# Patient Record
Sex: FEMALE | Race: WHITE | NOT HISPANIC OR LATINO
[De-identification: names, ages, dates, MRNs, and addresses within clinical notes are randomized per-mention and may not be internally consistent; named-entity substitution may affect disease eponyms.]

---

## 2019-01-10 ENCOUNTER — APPOINTMENT (OUTPATIENT)
Dept: OBGYN | Facility: CLINIC | Age: 24
End: 2019-01-10

## 2019-03-07 ENCOUNTER — APPOINTMENT (OUTPATIENT)
Dept: OBGYN | Facility: CLINIC | Age: 24
End: 2019-03-07
Payer: COMMERCIAL

## 2019-03-07 ENCOUNTER — OUTPATIENT (OUTPATIENT)
Dept: OUTPATIENT SERVICES | Facility: HOSPITAL | Age: 24
LOS: 1 days | Discharge: HOME | End: 2019-03-07

## 2019-03-07 VITALS
SYSTOLIC BLOOD PRESSURE: 100 MMHG | DIASTOLIC BLOOD PRESSURE: 60 MMHG | BODY MASS INDEX: 17.94 KG/M2 | HEIGHT: 62.5 IN | WEIGHT: 100 LBS

## 2019-03-07 DIAGNOSIS — Z01.419 ENCOUNTER FOR GYNECOLOGICAL EXAMINATION (GENERAL) (ROUTINE) WITHOUT ABNORMAL FINDINGS: ICD-10-CM

## 2019-03-07 DIAGNOSIS — Z78.9 OTHER SPECIFIED HEALTH STATUS: ICD-10-CM

## 2019-03-07 LAB
BILIRUB UR QL STRIP: NORMAL
GLUCOSE UR-MCNC: NORMAL
HCG UR QL: NORMAL EU/DL
HGB UR QL STRIP.AUTO: 50
KETONES UR-MCNC: NORMAL
LEUKOCYTE ESTERASE UR QL STRIP: NORMAL
NITRITE UR QL STRIP: NORMAL
PH UR STRIP: 6.5
PROT UR STRIP-MCNC: NORMAL
SP GR UR STRIP: 1.01

## 2019-03-07 PROCEDURE — 99395 PREV VISIT EST AGE 18-39: CPT

## 2019-03-07 PROCEDURE — 81003 URINALYSIS AUTO W/O SCOPE: CPT | Mod: NC,QW

## 2019-03-07 NOTE — PHYSICAL EXAM
[Awake] : awake [Alert] : alert [Soft] : soft [Oriented x3] : oriented to person, place, and time [Normal] : uterus [Uterine Adnexae] : were not tender and not enlarged [RRR, No Murmurs] : RRR, no murmurs [CTAB] : CTAB [Acute Distress] : no acute distress [LAD] : no lymphadenopathy [Thyroid Nodule] : no thyroid nodule [Goiter] : no goiter [Mass] : no breast mass [Nipple Discharge] : no nipple discharge [Axillary LAD] : no axillary lymphadenopathy [Tender] : non tender [Distended] : not distended [Depressed Mood] : not depressed

## 2019-03-07 NOTE — COUNSELING
[Breast Self Exam] : breast self exam [Exercise] : exercise [Vitamins/Supplements] : vitamins/supplements [STD (testing, results, tx)] : STD (testing, results, tx) [Contraception] : contraception [Vaccines] : vaccines [Safe Sexual Practices] : safe sexual practices

## 2019-03-14 LAB
C TRACH RRNA SPEC QL NAA+PROBE: NOT DETECTED
N GONORRHOEA RRNA SPEC QL NAA+PROBE: NOT DETECTED
SOURCE AMPLIFICATION: NORMAL

## 2020-03-12 ENCOUNTER — APPOINTMENT (OUTPATIENT)
Dept: OBGYN | Facility: CLINIC | Age: 25
End: 2020-03-12

## 2020-05-19 ENCOUNTER — APPOINTMENT (OUTPATIENT)
Dept: DISASTER EMERGENCY | Facility: CLINIC | Age: 25
End: 2020-05-19

## 2020-05-19 ENCOUNTER — MESSAGE (OUTPATIENT)
Age: 25
End: 2020-05-19

## 2020-07-09 ENCOUNTER — OUTPATIENT (OUTPATIENT)
Dept: OUTPATIENT SERVICES | Facility: HOSPITAL | Age: 25
LOS: 1 days | Discharge: HOME | End: 2020-07-09

## 2020-07-09 DIAGNOSIS — Z00.8 ENCOUNTER FOR OTHER GENERAL EXAMINATION: ICD-10-CM

## 2020-07-13 ENCOUNTER — OUTPATIENT (OUTPATIENT)
Dept: OUTPATIENT SERVICES | Facility: HOSPITAL | Age: 25
LOS: 1 days | Discharge: HOME | End: 2020-07-13

## 2020-07-13 DIAGNOSIS — Z00.8 ENCOUNTER FOR OTHER GENERAL EXAMINATION: ICD-10-CM

## 2020-07-13 LAB — HCG UR QL: NEGATIVE — SIGNIFICANT CHANGE UP

## 2020-08-07 ENCOUNTER — TRANSCRIPTION ENCOUNTER (OUTPATIENT)
Age: 25
End: 2020-08-07

## 2020-09-24 ENCOUNTER — APPOINTMENT (OUTPATIENT)
Dept: OBGYN | Facility: CLINIC | Age: 25
End: 2020-09-24
Payer: COMMERCIAL

## 2020-09-24 VITALS — DIASTOLIC BLOOD PRESSURE: 60 MMHG | WEIGHT: 109 LBS | TEMPERATURE: 98 F | SYSTOLIC BLOOD PRESSURE: 100 MMHG

## 2020-09-24 PROCEDURE — 99212 OFFICE O/P EST SF 10 MIN: CPT | Mod: 25

## 2020-09-24 PROCEDURE — 99395 PREV VISIT EST AGE 18-39: CPT

## 2020-09-24 NOTE — HISTORY OF PRESENT ILLNESS
[FreeTextEntry1] : \par  [N] : Patient denies prior pregnancies [TextBox_4] : 25yo who presents for annual GYN exam. Doing well.\par \par Menarche at age 16, regular, lasting 4-5 days. Heavy bleeding x2 days, severe cramping. Motrin PRN.\par \par Initiated OCPs about 1 year ago for treatment of dysmenorrhea. Discontinued after a couple months due to moodiness. Would like to try another pill. Has taken [place] in the past with good tolerance.\par \par Sexually active with partner of 1.5 years\par Denies dyspareunia\par Contraception: inconsistent condoms\par Denies history of STIs\par Denies vaginal discharge, irritation or odor [PapSmeardate] : 7/2019 [TextBox_31] : CHRISTIANO

## 2020-09-24 NOTE — PHYSICAL EXAM
[Appropriately responsive] : appropriately responsive [Alert] : alert [No Acute Distress] : no acute distress [No Lymphadenopathy] : no lymphadenopathy [Regular Rate Rhythm] : regular rate rhythm [No Murmurs] : no murmurs [Clear to Auscultation B/L] : clear to auscultation bilaterally [Soft] : soft [Non-tender] : non-tender [Non-distended] : non-distended [No HSM] : No HSM [Oriented x3] : oriented x3 [Examination Of The Breasts] : a normal appearance [No Masses] : no breast masses were palpable [Normal] : normal [Uterine Adnexae] : normal [FreeTextEntry8] : Uterus small, anteverted, mobile and nontender. No CMT

## 2020-09-24 NOTE — COUNSELING
[Nutrition/ Exercise/ Weight Management] : nutrition, exercise, weight management [Breast Self Exam] : breast self exam [Contraception/ Emergency Contraception/ Safe Sexual Practices] : contraception, emergency contraception, safe sexual practices

## 2021-02-04 ENCOUNTER — OUTPATIENT (OUTPATIENT)
Dept: OUTPATIENT SERVICES | Facility: HOSPITAL | Age: 26
LOS: 1 days | Discharge: HOME | End: 2021-02-04
Payer: COMMERCIAL

## 2021-02-04 DIAGNOSIS — R05 COUGH: ICD-10-CM

## 2021-02-04 PROCEDURE — 71046 X-RAY EXAM CHEST 2 VIEWS: CPT | Mod: 26

## 2021-05-12 ENCOUNTER — OUTPATIENT (OUTPATIENT)
Dept: OUTPATIENT SERVICES | Facility: HOSPITAL | Age: 26
LOS: 1 days | Discharge: HOME | End: 2021-05-12
Payer: COMMERCIAL

## 2021-05-12 DIAGNOSIS — M79.609 PAIN IN UNSPECIFIED LIMB: ICD-10-CM

## 2021-05-12 PROCEDURE — 93970 EXTREMITY STUDY: CPT | Mod: 26

## 2021-07-29 ENCOUNTER — APPOINTMENT (OUTPATIENT)
Dept: OBGYN | Facility: CLINIC | Age: 26
End: 2021-07-29
Payer: COMMERCIAL

## 2021-07-29 VITALS — DIASTOLIC BLOOD PRESSURE: 60 MMHG | WEIGHT: 118 LBS | SYSTOLIC BLOOD PRESSURE: 100 MMHG

## 2021-07-29 PROCEDURE — 99213 OFFICE O/P EST LOW 20 MIN: CPT

## 2021-07-29 RX ORDER — NORGESTIMATE AND ETHINYL ESTRADIOL 7DAYSX3 LO
0.18/0.215/0.25 KIT ORAL
Qty: 84 | Refills: 1 | Status: DISCONTINUED | COMMUNITY
Start: 2020-09-24 | End: 2021-07-29

## 2021-07-29 NOTE — PLAN
[FreeTextEntry1] : \par Discussed OCP initiation for 20 minutes. Patient is aware that she will be given oral contraceptive pills and if taken correctly, meaning same time every day, the pill will prevent pregnancy. Also counseled what to do in case of a missed pill.\par Explained the following benefits aside from pregnancy prevention:\par -decreased menstrual cramps\par -more regular menstrual bleeding \par -decreased menstrual bleeding \par -helps improve acne\par -protects against ovarian and uterine cancer. Also, protects fertility - no ovulation on ocps.\par Also discussed possible side effects including HA, irregular bleeding, amenorrhea, breast tenderness and mood changes. Weight gain is considered a myth however birth control can increase or decrease appetite within first 3 months of starting pill. Patient is reminded that condoms should be used to prevent sexually transmitted diseases.\par Potential complications of birth control pills including thromboembolic events (such as blood clots of legs/lungs, stroke) HTN, hyperlipidemia, gallbladder disease and benign liver cysts were discussed. Patient is aware of the need to refrain from smoking while taking OCPs, as this would further increase her risk of thromboembolic events.\par Advised patient to contact office immediately if any of the following occur:\par -unusual headache\par -change in vision or blurry\par -leg pain\par -leg swelling\par -rash\par -severe abdominal pain\par -chest pain\par -shortness of breath\par Pt to F/U in Sept for annual exam,. \par

## 2021-07-29 NOTE — HISTORY OF PRESENT ILLNESS
[Regular Cycle Intervals] : periods have been regular [Menarche Age: ____] : age at menarche was [unfilled] [No] : Patient does not have concerns regarding sex [Currently Active] : currently active [Men] : men [Yes] : Yes [FreeTextEntry1] : 7/18/21

## 2021-08-08 ENCOUNTER — TRANSCRIPTION ENCOUNTER (OUTPATIENT)
Age: 26
End: 2021-08-08

## 2021-10-14 ENCOUNTER — EMERGENCY (EMERGENCY)
Facility: HOSPITAL | Age: 26
LOS: 0 days | Discharge: HOME | End: 2021-10-14
Attending: EMERGENCY MEDICINE | Admitting: EMERGENCY MEDICINE
Payer: COMMERCIAL

## 2021-10-14 VITALS
RESPIRATION RATE: 18 BRPM | DIASTOLIC BLOOD PRESSURE: 76 MMHG | WEIGHT: 117.95 LBS | OXYGEN SATURATION: 99 % | TEMPERATURE: 97 F | HEIGHT: 63 IN | SYSTOLIC BLOOD PRESSURE: 137 MMHG | HEART RATE: 86 BPM

## 2021-10-14 DIAGNOSIS — R09.89 OTHER SPECIFIED SYMPTOMS AND SIGNS INVOLVING THE CIRCULATORY AND RESPIRATORY SYSTEMS: ICD-10-CM

## 2021-10-14 DIAGNOSIS — T17.298A OTHER FOREIGN OBJECT IN PHARYNX CAUSING OTHER INJURY, INITIAL ENCOUNTER: ICD-10-CM

## 2021-10-14 DIAGNOSIS — Z71.1 PERSON WITH FEARED HEALTH COMPLAINT IN WHOM NO DIAGNOSIS IS MADE: ICD-10-CM

## 2021-10-14 PROCEDURE — 99284 EMERGENCY DEPT VISIT MOD MDM: CPT

## 2021-10-14 PROCEDURE — 70360 X-RAY EXAM OF NECK: CPT | Mod: 26

## 2021-10-14 NOTE — ED PROVIDER NOTE - NSFOLLOWUPINSTRUCTIONS_ED_ALL_ED_FT
Swallowed Foreign Body, Adult    A swallowed foreign body means that you swallow something and it gets stuck. It might be food or something else. The object may get stuck in the tube that connects your throat to your stomach (esophagus), or it may get stuck in another part of your belly (digestive tract). It is very important to tell your doctor what you have swallowed.    Sometimes, the object will pass through your body on its own. Sometimes, the object will pass after you are given a medicine to relax your throat. The object may need to be taken out by a doctor if it is dangerous or if it will not pass through your body on its own. An object may need to be removed with surgery if:  It gets stuck in your throat.  You cannot swallow.  You cannot breathe well.  It is sharp.  It is harmful or poisonous (toxic), like batteries or drugs.  Follow these instructions at home:  Eat what you normally eat if your doctor says that you can.  Keep checking your poop (stool) to see if the object has come out of your body.  Call your doctor if the object has not come out of your body after 3 days.  If you had surgery (endoscopy) to remove the object:  Care for yourself after surgery as told by your doctor.  Keep all follow-up visits as told by your doctor. This is important.    Contact a doctor if:  You still have problems after you have been treated.  The object has not come out of your body after 3 days.  Get help right away if:  You have a fever.  You have pain in your chest or your belly.  You cough up blood.  You have blood in your poop or your throw up (vomit).  This information is not intended to replace advice given to you by your health care provider. Make sure you discuss any questions you have with your health care provider.    Document Released: 04/03/2012 Document Revised: 05/25/2017 Document Reviewed: 03/16/2016  Elsevier Interactive Patient Education © 2019 Elsevier Inc.

## 2021-10-14 NOTE — ED PROVIDER NOTE - PATIENT PORTAL LINK FT
You can access the FollowMyHealth Patient Portal offered by [facility] by registering at the following website: [email or web address]. By joining [employer]’s FollowMyHealth portal, you will also be able to view your health information using other applications (apps) compatible with our system.

## 2021-10-14 NOTE — ED PROVIDER NOTE - ATTENDING CONTRIBUTION TO CARE
I personally evaluated the patient. I reviewed the Resident’s or Physician Assistant’s note (as assigned above), and agree with the findings and plan except as documented in my note.     25 female here for foreign body sensation from a recently administered Rx, macrobid for UTI. No drooling, change in voice, difficulty swallowing.  Has sensation of foreign body in her throat that is persistent. No prior history of similar.     ROS otherwise unremarkable    PE: female in no distress. CV: pulses intact. CHEST: normal work of breathing. NECK: normal ROM. no masses. ABD: nondistended. SKIN: normal. EXT: FROM. NEURO: AAO 3 no focal deficits. HEENT: mucosa normal no obvious foreign body noted in oropharynx.     Impression: foreign body    Plan: imaging, reevaluation

## 2021-10-14 NOTE — ED PROVIDER NOTE - PHYSICAL EXAMINATION
CONSTITUTIONAL: Well-developed; well-nourished; in no acute distress  SKIN: warm, dry  HEAD: Normocephalic  EYES: no conjunctival erythema  ENT: No nasal discharge; airway clear, normal oropharynx no foreign body visualized  NECK: non tender full rom  CARD: Regular rate and rhythm  RESP: No wheezes, rales or rhonchi, normal respiratory effort  EXT: Normal ROM  NEURO: Alert, oriented, grossly unremarkable  PSYCH: Cooperative, appropriate

## 2021-10-14 NOTE — ED PROVIDER NOTE - CLINICAL SUMMARY MEDICAL DECISION MAKING FREE TEXT BOX
25 female here for foreign body sensation after a self administered pill. Had screening imaging supportive care medications and reevaluation, no acute emergent findings, symptoms unchanged, plan discussed with patient, advised to drink carbonated beverages at home, will discharge with outpatient management and return and follow up instructions.

## 2021-10-14 NOTE — ED PROVIDER NOTE - CARE PROVIDER_API CALL
Pillo Cruz)  Otolaryngology  [address] [place] [address], [address] [place] [address]  Phone: [phone]  Fax: [phone]  Follow Up Time: 1-3 Days

## 2021-10-14 NOTE — ED PROVIDER NOTE - NSFOLLOWUPCLINICS_GEN_ALL_ED_FT
[facility]  ENT  [address], 2nd [place] [address]  Phone: [phone]  Fax:   Follow Up Time: 4-6 Days

## 2021-10-14 NOTE — ED PROVIDER NOTE - OBJECTIVE STATEMENT
25yof w/o pmhx of uti who present with throat irritation. she took macrobid pill 330pm for her uti and felt like the pill was stuck in her throat. has been able to swallow fluids and secretion, and has been breathing without issues. no f cp sob abd pain n v. she tried drinking a lot of water rapidly but the irritation sensation is still there.

## 2021-10-14 NOTE — ED PROVIDER NOTE - NS ED ROS FT
Constitutional: No fever   Eyes:  No visual changes  Ears:  No hearing changes  Neck: No neck pain +throat irritation  Cardiac:  No chest pain  Respiratory:  No SOB   GI:  No abdominal pain, nausea, or vomiting  :  No dysuria  MS:  No back pain  Neuro:  No headache or weakness.  No LOC  Skin:  No skin rash

## 2021-10-15 ENCOUNTER — INPATIENT (INPATIENT)
Facility: HOSPITAL | Age: 26
LOS: 0 days | Discharge: HOME | End: 2021-10-16
Attending: INTERNAL MEDICINE | Admitting: INTERNAL MEDICINE
Payer: COMMERCIAL

## 2021-10-15 VITALS
HEIGHT: 63 IN | TEMPERATURE: 99 F | OXYGEN SATURATION: 99 % | HEART RATE: 84 BPM | SYSTOLIC BLOOD PRESSURE: 115 MMHG | RESPIRATION RATE: 20 BRPM | DIASTOLIC BLOOD PRESSURE: 73 MMHG | WEIGHT: 117.95 LBS

## 2021-10-15 LAB
ALBUMIN SERPL ELPH-MCNC: 5 G/DL — SIGNIFICANT CHANGE UP (ref 3.5–5.2)
ALP SERPL-CCNC: 65 U/L — SIGNIFICANT CHANGE UP (ref 30–115)
ALT FLD-CCNC: 11 U/L — SIGNIFICANT CHANGE UP (ref 0–41)
ANION GAP SERPL CALC-SCNC: 19 MMOL/L — HIGH (ref 7–14)
APTT BLD: 29.1 SEC — SIGNIFICANT CHANGE UP (ref 27–39.2)
AST SERPL-CCNC: 20 U/L — SIGNIFICANT CHANGE UP (ref 0–41)
BASOPHILS # BLD AUTO: 0.03 K/UL — SIGNIFICANT CHANGE UP (ref 0–0.2)
BASOPHILS NFR BLD AUTO: 0.3 % — SIGNIFICANT CHANGE UP (ref 0–1)
BILIRUB SERPL-MCNC: 0.5 MG/DL — SIGNIFICANT CHANGE UP (ref 0.2–1.2)
BLD GP AB SCN SERPL QL: SIGNIFICANT CHANGE UP
BUN SERPL-MCNC: 11 MG/DL — SIGNIFICANT CHANGE UP (ref 10–20)
CALCIUM SERPL-MCNC: 9.6 MG/DL — SIGNIFICANT CHANGE UP (ref 8.5–10.1)
CHLORIDE SERPL-SCNC: 100 MMOL/L — SIGNIFICANT CHANGE UP (ref 98–110)
CO2 SERPL-SCNC: 19 MMOL/L — SIGNIFICANT CHANGE UP (ref 17–32)
CREAT SERPL-MCNC: 0.6 MG/DL — LOW (ref 0.7–1.5)
EOSINOPHIL # BLD AUTO: 0.03 K/UL — SIGNIFICANT CHANGE UP (ref 0–0.7)
EOSINOPHIL NFR BLD AUTO: 0.3 % — SIGNIFICANT CHANGE UP (ref 0–8)
GLUCOSE SERPL-MCNC: 84 MG/DL — SIGNIFICANT CHANGE UP (ref 70–99)
HCT VFR BLD CALC: 40.5 % — SIGNIFICANT CHANGE UP (ref 37–47)
HGB BLD-MCNC: 13.2 G/DL — SIGNIFICANT CHANGE UP (ref 12–16)
IMM GRANULOCYTES NFR BLD AUTO: 0.4 % — HIGH (ref 0.1–0.3)
INR BLD: 1.17 RATIO — SIGNIFICANT CHANGE UP (ref 0.65–1.3)
LYMPHOCYTES # BLD AUTO: 1.13 K/UL — LOW (ref 1.2–3.4)
LYMPHOCYTES # BLD AUTO: 9.4 % — LOW (ref 20.5–51.1)
MCHC RBC-ENTMCNC: 27.7 PG — SIGNIFICANT CHANGE UP (ref 27–31)
MCHC RBC-ENTMCNC: 32.6 G/DL — SIGNIFICANT CHANGE UP (ref 32–37)
MCV RBC AUTO: 84.9 FL — SIGNIFICANT CHANGE UP (ref 81–99)
MONOCYTES # BLD AUTO: 0.5 K/UL — SIGNIFICANT CHANGE UP (ref 0.1–0.6)
MONOCYTES NFR BLD AUTO: 4.2 % — SIGNIFICANT CHANGE UP (ref 1.7–9.3)
NEUTROPHILS # BLD AUTO: 10.22 K/UL — HIGH (ref 1.4–6.5)
NEUTROPHILS NFR BLD AUTO: 85.4 % — HIGH (ref 42.2–75.2)
NRBC # BLD: 0 /100 WBCS — SIGNIFICANT CHANGE UP (ref 0–0)
PLATELET # BLD AUTO: 317 K/UL — SIGNIFICANT CHANGE UP (ref 130–400)
POTASSIUM SERPL-MCNC: 3.9 MMOL/L — SIGNIFICANT CHANGE UP (ref 3.5–5)
POTASSIUM SERPL-SCNC: 3.9 MMOL/L — SIGNIFICANT CHANGE UP (ref 3.5–5)
PROT SERPL-MCNC: 8 G/DL — SIGNIFICANT CHANGE UP (ref 6–8)
PROTHROM AB SERPL-ACNC: 13.4 SEC — HIGH (ref 9.95–12.87)
RBC # BLD: 4.77 M/UL — SIGNIFICANT CHANGE UP (ref 4.2–5.4)
RBC # FLD: 12.9 % — SIGNIFICANT CHANGE UP (ref 11.5–14.5)
SODIUM SERPL-SCNC: 138 MMOL/L — SIGNIFICANT CHANGE UP (ref 135–146)
WBC # BLD: 11.96 K/UL — HIGH (ref 4.8–10.8)
WBC # FLD AUTO: 11.96 K/UL — HIGH (ref 4.8–10.8)

## 2021-10-15 PROCEDURE — 70360 X-RAY EXAM OF NECK: CPT | Mod: 26

## 2021-10-15 PROCEDURE — 99285 EMERGENCY DEPT VISIT HI MDM: CPT

## 2021-10-15 PROCEDURE — 70490 CT SOFT TISSUE NECK W/O DYE: CPT | Mod: 26,MA

## 2021-10-15 RX ORDER — IOHEXOL 300 MG/ML
30 INJECTION, SOLUTION INTRAVENOUS ONCE
Refills: 0 | Status: COMPLETED | OUTPATIENT
Start: 2021-10-15 | End: 2021-10-15

## 2021-10-15 RX ORDER — GLUCAGON INJECTION, SOLUTION 0.5 MG/.1ML
1 INJECTION, SOLUTION SUBCUTANEOUS ONCE
Refills: 0 | Status: COMPLETED | OUTPATIENT
Start: 2021-10-15 | End: 2021-10-15

## 2021-10-15 RX ADMIN — GLUCAGON INJECTION, SOLUTION 1 MILLIGRAM(S): 0.5 INJECTION, SOLUTION SUBCUTANEOUS at 15:59

## 2021-10-15 RX ADMIN — IOHEXOL 30 MILLILITER(S): 300 INJECTION, SOLUTION INTRAVENOUS at 19:17

## 2021-10-15 NOTE — CONSULT NOTE ADULT - SUBJECTIVE AND OBJECTIVE BOX
Gastroenterology Consultation:    Patient is a 25y old  Female who presents with a chief complaint of       Admitted on: 10-15-21      HPI:    24yo Female who presents with FB sensation - pt brought back to ED for FB noted on xray. PT seen and examined at bedside - states she took a Macrobid pill, felt it get stuck in her throat/chest and came to the ED. [facility] and sent patient [facility], has been tolerating PO since (solid and liquid), but still feels sensation of something stuck. Pt able to speak clearly and without any SOB/diff breathing.      Prior EGD: none    Prior Colonoscopy: none      PAST MEDICAL & SURGICAL HISTORY:        FAMILY HISTORY:      Social History:  Tobacco: deneis  Alcohol: denies  Drugs: denies    Home Medications:        MEDICATIONS  (STANDING):    MEDICATIONS  (PRN):      Allergies  No Known Allergies      Review of Systems:   Constitutional:  No Fever, No Chills  ENT/Mouth:  No Hearing Changes,  No Difficulty Swallowing  Eyes:  No Eye Pain, No Vision Changes  Cardiovascular:  No Chest Pain, No Palpitations  Respiratory:  No Cough, No Dyspnea  Gastrointestinal:  As described in HPI  Musculoskeletal:  No Joint Swelling, No Back Pain  Skin:  No Skin Lesions, No Jaundice  Neuro:  No Syncope, No Dizziness  Heme/Lymph:  No Bruising, No Bleeding.          Physical Examination:  T(C): 37 (10-15-21 @ 14:28), Max: 37 (10-15-21 @ 14:28)  HR: 84 (10-15-21 @ 14:28) (84 - 84)  BP: 115/73 (10-15-21 @ 14:28) (115/73 - 115/73)  RR: 20 (10-15-21 @ 14:28) (20 - 20)  SpO2: 99% (10-15-21 @ 14:28) (99% - 99%)  Height (cm): 160 (10-15-21 @ 14:28), 160 (10-14-21 @ 17:35)  Weight (kg): 53.5 (10-15-21 @ 14:28), 53.5 (10-14-21 @ 17:35)        GENERAL: AAOx3, no acute distress.  HEAD:  Atraumatic, Normocephalic  EYES: conjunctiva and sclera clear  NECK: Supple, no JVD or thyromegaly  CHEST/LUNG: Clear to auscultation bilaterally; No wheeze, rhonchi, or rales  HEART: Regular rate and rhythm; normal S1, S2, No murmurs.  ABDOMEN: Soft, nontender, nondistended; Bowel sounds present  NEUROLOGY: No asterixis or tremor.   SKIN: Intact, no jaundice        Data:    Hgb Trend:            Liver panel trend:              Radiology:      
Pt is a 26yo Female who presents with FB sensation - pt brought back to ED for FB noted on xray. PT seen and examined at bedside - states she took a Macrobid pill, felt it get stuck in her throat/chest and came to the ED. [facility] and sent patient home, has been tolerating PO since (solid and liquid), but still feels sensation of something stuck. Pt able to speak clearly and without any SOB/diff breathing.     PAST MEDICAL & SURGICAL HISTORY:  UTI on Macrobid      Allergies    No Known Allergies    Intolerances    REVIEW OF SYSTEMS   [x] A ten-point review of systems was otherwise negative except as noted.      Vital Signs Last 24 Hrs  T(C): 37 (15 Oct 2021 14:28), Max: 37 (15 Oct 2021 14:28)  T(F): 98.6 (15 Oct 2021 14:28), Max: 98.6 (15 Oct 2021 14:28)  HR: 84 (15 Oct 2021 14:28) (84 - 86)  BP: 115/73 (15 Oct 2021 14:28) (115/73 - 137/76)  RR: 20 (15 Oct 2021 14:28) (18 - 20)  SpO2: 99% (15 Oct 2021 14:28) (99% - 99%)    GEN: NAD, awake and alert. No drooling or pooling of secretions. No stridor or stertor. Good vocal quality, no hoarseness.   SKIN: Good color, non diaphoretic.  HEENT: NC/AT; Oral mucosa pink and moist. No erythema or edema noted to buccal mucosa, tongue, FOM, uvula or posterior oropharynx. Uvula midline.   NECK: Trachea midline, Neck supple, no TTP to B/L lateral neck, no cervical LAD.  RESP: No dyspnea, non-labored breathing. No use of accessory muscles.   CARDIO: +S1/S2  ABDO: Soft, NT.  EXT: GUZMAN x 4    Fiberoptic Laryngoscopy: No masses or lesions noted to NP/OP/HP. Laryngeal structures intact, no edema or erythema noted. Epiglottis crisp, no edema. TVC/FVC mobile and intact, no glottic gap noted. No FB noted in larynx.    RADIOLOGY & ADDITIONAL STUDIES:    < from: Xray Neck Soft Tissue (10.15.21 @ 15:46) >    EXAM:  XR NECK SOFT TISSUE            PROCEDURE DATE:  10/15/2021        INTERPRETATION:  Reason for examination: Pill stuck in throat. Rule out foreign body    Examination: Soft tissue neck (AP lateral views).  Comparison: Soft tissue neck-10/14/2021      Findings/  impression:    Lines/ Support devices: none  Previously reported cervical esophageal foreign body measuring 9 x 4 mm is noted (C4-5 level), unchanged in position. See lateral view.      Midline normal-sized trachea.    Unremarkable osseous structures.      --- End of Report ---        FELIPE NEWTON MD; Attending Radiologist  This document has been electronically signed. Oct 15 2021  4:19PM

## 2021-10-15 NOTE — ED ADULT TRIAGE NOTE - CHIEF COMPLAINT QUOTE
Patient states she feels like she has a pill stuck in her throat, patient is a call back for results in xray

## 2021-10-15 NOTE — ED PROVIDER NOTE - PHYSICAL EXAMINATION
GENERAL: Well-nourished, Well-developed. NAD.  HEAD: No visible or palpable bumps or hematomas. No ecchymosis behind ears B/L.  Eyes: PERRLA, EOMI. No asymmetry. No nystagmus. No conjunctival injection. Non-icteric sclera.  ENMT: MMM. No pharyngeal erythema or exudates. Uvula midline. handling secretions.   Neck: Supple. FROM  CVS: Normal S1,S2. No murmurs appreciated on auscultation   RESP: No use of accessory muscles. Chest rise symmetrical with good expansion. Lungs clear to auscultation B/L. No wheezing, rales, or rhonchi auscultated.  GI: Normal auscultation of bowel sounds in all 4 quadrants. Soft, Nontender, Nondistended. No guarding or rebound tenderness. No CVAT B/L.  Skin: Warm, Dry. No rashes or lesions. Good cap refill < 2 sec B/L.  EXT: Radial and pedal pulses present B/L. No calf tenderness or swelling B/L. No palpable cords. No pedal edema B/L.  Neuro: AA&O x 3. Sensation grossly intact. Strength 5/5 B/L. Gait within normal limits.

## 2021-10-15 NOTE — ED POST DISCHARGE NOTE - DETAILS
IS RETURNING TO ED Merged with [facility], IN CASE IN ENT CONSULT IS NEEDED. OBS ED RESIDENT TACO WAS NOTIFIED.

## 2021-10-15 NOTE — ED PROVIDER NOTE - NS_ ATTENDINGSCRIBEDETAILS _ED_A_ED_FT
I have personally evaluated this patient. I have seen this patient with a medical scribe, please see progress for my contribution to care. I have reviewed scribe notes which are accurate.

## 2021-10-15 NOTE — ED PROVIDER NOTE - CLINICAL SUMMARY MEDICAL DECISION MAKING FREE TEXT BOX
ATTENDING NOTE: I personally evaluated the patient. I reviewed the Resident’s note (as assigned above), and agree with the findings and plan except as documented in my note.   26 y/o F with PMH of UTI on Macrobid 7 days ago, p/w lump in throat sensation x 2 dads. Pt was seen at [facility], received XRs and was told to come back for (+) foreign body. Denies f/c, CP, n/v/d. Pt is tolerating PO.   Well appearing, NAD, non toxic. NCAT PERRLA EOMI, no drooling or tenderness in throat neck supple non tender normal wob cta bl rrr abdomen s nt nd no rebound no guarding WWPx4 neuro non focal  XR showed foreign body under the vocal chords, pending ENT I personally evaluated the patient. I reviewed the Resident’s note (as assigned above), and agree with the findings and plan except as documented in my note.   24 y/o F with PMH of UTI on Macrobid 7 days ago, p/w lump in throat sensation x 2 dads. Pt was seen at [facility], received XRs and was told to come back for (+) foreign body. Denies f/c, CP, n/v/d. Pt is tolerating PO.   Well appearing, NAD, non toxic. NCAT PERRLA EOMI, no drooling or tenderness in throat neck supple non tender normal wob cta bl rrr abdomen s nt nd no rebound no guarding WWPx4 neuro non focal  XR showed foreign body under the vocal chords, will admit for endoscopy, ent and GI spoken to.

## 2021-10-15 NOTE — ED PROVIDER NOTE - NS ED ROS FT
Constitutional: (-) fever (-) malaise (-) diaphoresis (-) chills (-) wt. loss (-) bodyaches (-) night sweats  Eyes: (-) visual changes (-) eye pain (-) eye discharge (-) photophobia (-) FB sensation  ENMT: (-) nasal or chest congestion (-) runny nose (-) sore throat (-) hoarseness  (-) hearing changes (-) ear pain (-) ear discharge or infections (-) neck pain (-) neck stiffness (+)foreign body sensation to throat  Cardiac: (-) chest pain  (-) palpitations (-) syncope (-) edema  Respiratory: (-) cough (-) SOB (-) BARRIENTOS  GI: (-) nausea (-) vomiting (-) diarrhea (-) abdominal pain   Neuro: (-) headache (-) dizziness (-) numbness/tingling to extremities B/L (-) weakness  Skin: (-) rash (-) laceration    Except as documented in the HPI, all other systems are negative.

## 2021-10-15 NOTE — ED PROVIDER NOTE - OBJECTIVE STATEMENT
24 yo F pmhx UTI currently taking PO macrobid, pt reports [facility] sensation in throat, feels like pill is stuck in throat, pt states she was discharged and called back today, xray soft tissue neck reveals foreign body. Pt states sensation is still present, is able to eat solids and liquids without difficulty, no drooling, fever, chills, nausea, vomiting, sob.

## 2021-10-15 NOTE — ED PROVIDER NOTE - PROGRESS NOTE DETAILS
NC: ENT consulted ATTENDING NOTE: I personally evaluated the patient. I reviewed the Resident’s note (as assigned above), and agree with the findings and plan except as documented in my note.   24 y/o F with PMH of UTI on Macrobid 7 days ago, p/w lump in throat sensation x 2 dads. Pt was seen at [facility], received XRs and was told to come back for (+) foreign body. Denies f/c, CP, n/v/d. Pt is tolerating PO.   Well appearing, NAD, non toxic. NCAT PERRLA EOMI, no drooling or tenderness in throat neck supple non tender normal wob cta bl rrr abdomen s nt nd no rebound no guarding WWPx4 neuro non focal  XR showed foreign body under the vocal chords, pending ENT. NC: ENT scoped pt at bedside, unable to see foreign body, GI consulted at this time. NC: Spoke to Dr Johnson GI, recommend CT chest and soft tissue neck with oral contrast, no need for endoscopy at this time.

## 2021-10-15 NOTE — ED POST DISCHARGE NOTE - RESULT SUMMARY
NECK XR- CERVICAL ESOPHAGEAL FB NOTED, PATIENT SWALLOWED A PILL. CASE DISCUSSED WITH DR. GIBBONS- SERA PATIENT BACK TO ED FOR REPEAT NECK XR TO ENSURE PASSAGE OF PILL.

## 2021-10-15 NOTE — CONSULT NOTE ADULT - ASSESSMENT
Pt is a 26yo Female who presents with FB sensation - pt brought back to ED for FB noted on xray, negative FFL for FB - esophageal in nature.     ·	GI consult  ·	no ENT intervention  ·	d/w ED staff
26yo Female who presents with FB sensation - pt brought back to ED for FB noted on xray. PT seen and examined at bedside - states she took a Macrobid pill, felt it get stuck in her throat/chest and came to the ED. [facility] and sent patient [facility], has been tolerating PO since (solid and liquid), but still feels sensation of something stuck. Pt able to speak clearly and without any SOB/diff breathing.    #Foreign body sensation (macrobid) in cervical esophagus - no impaction  able to speak full sentences adn tolerate food    Rec  - please get stat CT neck/chest with PO contrast. if unremarkable without evidence of esophageal impaction can be discharged to follow up with GI as outpatient  - Follow up with our [facility] located at [address] [place] [address]. Phone Number: [phone]

## 2021-10-15 NOTE — ED PROVIDER NOTE - ATTENDING CONTRIBUTION TO CARE
Results reviewed and discussed with pt and printed for patient. Anticipatory guidance given including close outpatient followup. Strict return precautions given. Pt verbalizes understanding of and agrees with plan. see mdm for attending note

## 2021-10-16 ENCOUNTER — TRANSCRIPTION ENCOUNTER (OUTPATIENT)
Age: 26
End: 2021-10-16

## 2021-10-16 VITALS
RESPIRATION RATE: 16 BRPM | HEART RATE: 71 BPM | OXYGEN SATURATION: 100 % | TEMPERATURE: 97 F | SYSTOLIC BLOOD PRESSURE: 100 MMHG | DIASTOLIC BLOOD PRESSURE: 61 MMHG

## 2021-10-16 LAB
COVID-19 SPIKE DOMAIN AB INTERP: POSITIVE
COVID-19 SPIKE DOMAIN ANTIBODY RESULT: >250 U/ML — HIGH
SARS-COV-2 IGG+IGM SERPL QL IA: >250 U/ML — HIGH
SARS-COV-2 IGG+IGM SERPL QL IA: POSITIVE
SARS-COV-2 RNA SPEC QL NAA+PROBE: SIGNIFICANT CHANGE UP

## 2021-10-16 PROCEDURE — 99233 SBSQ HOSP IP/OBS HIGH 50: CPT

## 2021-10-16 PROCEDURE — 70360 X-RAY EXAM OF NECK: CPT | Mod: 26

## 2021-10-16 PROCEDURE — 99236 HOSP IP/OBS SAME DATE HI 85: CPT

## 2021-10-16 RX ORDER — PANTOPRAZOLE SODIUM 20 MG/1
1 TABLET, DELAYED RELEASE ORAL
Qty: 15 | Refills: 0
Start: 2021-10-16 | End: 2021-10-30

## 2021-10-16 NOTE — H&P ADULT - HISTORY OF PRESENT ILLNESS
24 y/o Female w/ No PMHx presents to ED for FB sensation. Patient states she recently experiencing Sx of UTI, so had UA which was Pos for LE and Px a course of Macrobide. Sx resolved shortly afterwards, howver patient wanted to complete her course of ABx. After taking her final pill, she felt a foreign body sensation, tried drinking fluids which did not help. Did not experiencing any wheezing or stridor, no SOB. Subsequently patient when to ED at [facility] and had XR, nothing was seen and was told to go home. SHortly after, patient was called by PA at [facility] and told she did infact have FB on XR. Was advised to preswent to ED at [facility] as there was ENT. Denies any fever, CP, SOB, Urinary Sx.     In ED, VS wnl, Labs unremarkable, XR Neck Cervical esophageal 9 mm x 4 mm foreign body. No prevertebral soft tissue mass. Straightening of anterior cervical curvature, levoscoliosis. ENT performed FFL which was neg for FB. GI consulted, Recc CT Neck and Chest, And if unremarkable without evidence of esophageal impaction can be discharged to follow up with GI as outpatient CT Neck  was neg for large or radiopaque filling defects are present within the proximal esophagus. Admitted to Medicine

## 2021-10-16 NOTE — DISCHARGE NOTE PROVIDER - HOSPITAL COURSE
26 y/o Female w/ No PMHx presents to ED for FB sensation. Patient states she recently experiencing Sx of UTI, so had UA which was Pos for LE and Px a course of Macrobide. Sx resolved shortly afterwards, howver patient wanted to complete her course of ABx. After taking her final pill, she felt a foreign body sensation, tried drinking fluids which did not help. Did not experiencing any wheezing or stridor, no SOB. Subsequently patient came to t ED .    # Medication induced esophagitis  - Foreign body sensation after taking macrobid  - Xray Neck Soft Tissue (10.14.21 @ 18:20) >Cervical esophageal 9 mm x 4 mm foreign body. No prevertebral soft tissue mass. Straightening of anterior cervical curvature, levoscoliosis.  - evaluated by EVT: negative FFL for FB --> no ENT intervention  - CT Neck Soft Tissue No Cont (10.15.21 @ 21:45) >No large or radiopaque filling defects are present within the proximal esophagus.  - GI eval ,: if CT neck/chest with PO contrast. if unremarkable without evidence of esophageal impaction can be discharged to follow up with GI as outpatient  - repeat xray this AM - neg  - Patient may have passed pill   - Pt tolerating her diet. Denies sob , chest pain  - case discussed with GI --> will discharge her on protonix, with outpt F/u with GI .    - Time spent> 40 minutes

## 2021-10-16 NOTE — DISCHARGE NOTE PROVIDER - CARE PROVIDERS DIRECT ADDRESSES
[email or web address] [email or web address]

## 2021-10-16 NOTE — DISCHARGE NOTE PROVIDER - PROVIDER TOKENS
PROVIDER:[TOKEN:[[id number]],FOLLOWUP:[1 week]],PROVIDER:[TOKEN:[[id number]],FOLLOWUP:[2 weeks]]

## 2021-10-16 NOTE — DISCHARGE NOTE PROVIDER - NSDCMRMEDTOKEN_GEN_ALL_CORE_FT
Protonix 40 mg oral delayed release tablet: 1 tab(s) orally once a day    pantoprazole 40 mg oral granule, delayed release: 1 each orally once a day

## 2021-10-16 NOTE — PROGRESS NOTE ADULT - ASSESSMENT
26yo Female who presents with FB sensation - pt brought back to ED for FB noted on xray. PT seen and examined at bedside - states she took a Macrobid pill, felt it get stuck in her throat/chest and came to the ED. [facility] did Xray and sent patient home, has been tolerating PO since (solid and liquid), but still feels sensation of something stuck. Pt able to speak clearly and without any SOB/diff breathing.    #Foreign body sensation (macrobid) in cervical esophagus - no impaction  able to speak full sentences and tolerate food  CT soft tissue neck showed no foreign body , repeat X-ray soft tissue neck showed resolution of previously described foreign body , images were reviewed with radiology today   patient was discharged home by primary team   will call patient to schedule her for endoscopy   Protonix 40 mg po QD

## 2021-10-16 NOTE — H&P ADULT - NSHPLABSRESULTS_GEN_ALL_CORE
13.2   11.96 )-----------( 317      ( 15 Oct 2021 21:10 )             40.5       10-15    138  |  100  |  11  ----------------------------<  84  3.9   |  19  |  0.6<L>    Ca    9.6      15 Oct 2021 21:10    TPro  8.0  /  Alb  5.0  /  TBili  0.5  /  DBili  x   /  AST  20  /  ALT  11  /  AlkPhos  65  10-15      PT/INR - ( 15 Oct 2021 21:10 )   PT: 13.40 sec;   INR: 1.17 ratio         PTT - ( 15 Oct 2021 21:10 )  PTT:29.1 sec

## 2021-10-16 NOTE — H&P ADULT - ASSESSMENT
26 y/o Female w/ No PMHx presents to ED for FB sensation    #Foreign Body Sensation  - In ED, VS wnl, Labs unremarkable  - XR Neck Cervical esophageal 9 mm x 4 mm foreign body. No prevertebral soft tissue mass. Straightening of anterior cervical curvature, levoscoliosis  - ENT performed FFL which was neg for FB  - GI consulted, Recc CT Neck and Chest, And if unremarkable without evidence of esophageal impaction can be discharged to follow up with GI as outpatient  - CT Neck  was neg for large or radiopaque filling defects are present within the proximal esophagus.  - Patient may have passed pill as CT Neck Neg  - f/u w/ GI in AM if need EGD  - NPO for now    #Diet: NPO  #DVT pro: None  #GI pro: None  #Dispo: f/u GI, Likely d/c in AM

## 2021-10-16 NOTE — DISCHARGE NOTE PROVIDER - NSDCCPCAREPLAN_GEN_ALL_CORE_FT
PRINCIPAL DISCHARGE DIAGNOSIS  Diagnosis: Esophageal foreign body  Assessment and Plan of Treatment: Medication induced esophagitis  - Foreign body sensation after taking macrobid  - evaluated by EVT: negative FFL for FB --> no ENT intervention  - CT Neck Soft Tissue No Cont (10.15.21 @ 21:45) >No large or radiopaque filling defects are present within the proximal esophagus.  - GI eval ,: if CT neck/chest with PO contrast. if unremarkable without evidence of esophageal impaction can be discharged to follow up with GI as outpatient  - repeat xray this AM - neg  - Patient may have passed pill   - Pt tolerating her diet. Denies sob , chest pain  -  discharged on protonix, with outpt F/u with GI .

## 2021-10-16 NOTE — PROGRESS NOTE ADULT - SUBJECTIVE AND OBJECTIVE BOX
Gastroenterology progress note:     Patient is a 25y old  Female who presents with a chief complaint of feeling something stuck in esophagus      Admitted on: 10-15-21    We are following the patient for: patient felt Macrobid pill stuck in esophagus      Interval History: patient is comfortable , is tolerating diet . CT soft tissue neck reported normal , repeat X-ray soft tissue neck showed resolution of the previously reported foreign body     Patient's medical problems are improving   Prior records reviewed (Y/N): Y  History obtained from someone other than patient (Y/N): Y      PAST MEDICAL & SURGICAL HISTORY:      MEDICATIONS  (STANDING):    MEDICATIONS  (PRN):      Allergies  No Known Allergies      Review of Systems:   Cardiovascular:  No Chest Pain, No Palpitations  Respiratory:  No Cough, No Dyspnea  Gastrointestinal:  As described in HPI    Physical Examination:  T(C): 36.3 (10-16-21 @ 14:39), Max: 36.3 (10-16-21 @ 14:39)  HR: 71 (10-16-21 @ 14:39) (71 - 71)  BP: 100/61 (10-16-21 @ 14:39) (100/61 - 100/61)  RR: 16 (10-16-21 @ 14:39) (16 - 16)  SpO2: 100% (10-16-21 @ 14:39) (100% - 100%)      Constitutional: No acute distress.  Respiratory:  No signs of respiratory distress. Lung sounds are clear bilaterally.  Cardiovascular:  S1 S2, Regular rate and rhythm.  Abdominal: Abdomen is soft, symmetric, and non-tender without distention.  Bowel sounds are present and normoactive in all four quadrants. No masses, hepatomegaly, or splenomegaly are noted.   Skin: No rashes, No Jaundice.        Data: (reviewed by attending)                        13.2   11.96 )-----------( 317      ( 15 Oct 2021 21:10 )             40.5     Hgb trend:  13.2  10-15-21 @ 21:10      10-15    138  |  100  |  11  ----------------------------<  84  3.9   |  19  |  0.6<L>    Ca    9.6      15 Oct 2021 21:10    TPro  8.0  /  Alb  5.0  /  TBili  0.5  /  DBili  x   /  AST  20  /  ALT  11  /  AlkPhos  65  10-15    Liver panel trend:  TBili 0.5   /   AST 20   /   ALT 11   /   AlkP 65   /   Tptn 8.0   /   Alb 5.0    /   DBili --      10-15      PT/INR - ( 15 Oct 2021 21:10 )   PT: 13.40 sec;   INR: 1.17 ratio         PTT - ( 15 Oct 2021 21:10 )  PTT:29.1 sec       Radiology: (reviewed by attending)

## 2021-10-16 NOTE — H&P ADULT - ATTENDING COMMENTS
Patient seen and examined independently. Agree with resident note with exceptions.     # Medication induced esophagitis  - Foreign body sensation after taking macrobid  - Xray Neck Soft Tissue (10.14.21 @ 18:20) >Cervical esophageal 9 mm x 4 mm foreign body. No prevertebral soft tissue mass. Straightening of anterior cervical curvature, levoscoliosis.  - evaluated by EVT: negative FFL for FB --> no ENT intervention  - CT Neck Soft Tissue No Cont (10.15.21 @ 21:45) >No large or radiopaque filling defects are present within the proximal esophagus.  - GI eval ,: if CT neck/chest with PO contrast. if unremarkable without evidence of esophageal impaction can be discharged to follow up with GI as outpatient  - repeat xray this AM - neg  - Patient may have passed pill   - Pt tolerating her diet. Denies sob , chest pain  - case discussed with GI --> will discharge her on protonix, with outpt F/u with GI

## 2021-10-16 NOTE — DISCHARGE NOTE PROVIDER - CARE PROVIDER_API CALL
Aviva Alva)  Internal Medicine  [address] [place] [address]  Phone: [phone]  Fax: [phone]  Follow Up Time: 1 week    Matteo Carrasco  Gastroenterology  [address] [place] [address]  Phone: [phone]  Fax: [phone]  Follow Up Time: 2 weeks

## 2021-10-16 NOTE — DISCHARGE NOTE NURSING/CASE MANAGEMENT/SOCIAL WORK - PATIENT PORTAL LINK FT
You can access the FollowMyHealth Patient Portal offered by [facility] by registering at the following website: [email or web address]. By joining [employer]’s FollowMyHealth portal, you will also be able to view your health information using other applications (apps) compatible with our system.

## 2021-10-22 DIAGNOSIS — K20.80 OTHER ESOPHAGITIS WITHOUT BLEEDING: ICD-10-CM
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
## 2021-10-22 DIAGNOSIS — R09.89 OTHER SPECIFIED SYMPTOMS AND SIGNS INVOLVING THE CIRCULATORY AND RESPIRATORY SYSTEMS: ICD-10-CM

## 2021-10-22 DIAGNOSIS — Z79.2 LONG TERM (CURRENT) USE OF ANTIBIOTICS: ICD-10-CM

## 2021-10-22 DIAGNOSIS — Z87.440 PERSONAL HISTORY OF URINARY (TRACT) INFECTIONS: ICD-10-CM

## 2021-10-22 DIAGNOSIS — Z92.22 PERSONAL HISTORY OF MONOCLONAL DRUG THERAPY: ICD-10-CM

## 2021-10-22 DIAGNOSIS — T18.198A OTHER FOREIGN OBJECT IN ESOPHAGUS CAUSING OTHER INJURY, INITIAL ENCOUNTER: ICD-10-CM

## 2021-10-22 DIAGNOSIS — T37.8X5A ADVERSE EFFECT OF OTHER SPECIFIED SYSTEMIC ANTI-INFECTIVES AND ANTIPARASITICS, INITIAL ENCOUNTER: ICD-10-CM

## 2022-02-10 ENCOUNTER — NON-APPOINTMENT (OUTPATIENT)
Age: 27
End: 2022-02-10

## 2022-02-11 ENCOUNTER — APPOINTMENT (OUTPATIENT)
Dept: OTOLARYNGOLOGY | Facility: CLINIC | Age: 27
End: 2022-02-11
Payer: COMMERCIAL

## 2022-02-11 VITALS — WEIGHT: 118 LBS | BODY MASS INDEX: 20.91 KG/M2 | HEIGHT: 63 IN

## 2022-02-11 PROCEDURE — 99203 OFFICE O/P NEW LOW 30 MIN: CPT | Mod: 25

## 2022-02-11 PROCEDURE — 31575 DIAGNOSTIC LARYNGOSCOPY: CPT

## 2022-02-11 NOTE — PHYSICAL EXAM
[Midline] : trachea located in midline position [Normal] : no rashes [de-identified] : cerumen removed at left

## 2022-02-11 NOTE — ASSESSMENT
[FreeTextEntry1] : Previous dysphonia and dysphagia, since resolved\par Normal endoscopy\par Reasurred patient

## 2022-02-11 NOTE — PROCEDURE
[None] : none [Flexible Endoscope] : examined with the flexible endoscope [de-identified] : Flexible laryngoscopy performed. Nasal cavity, nasopharynx, oropharynx, hypopharynx, and larynx evaluated. No masses or lesions, bilateral true vocal folds symmetric and mobile.\par  [de-identified] : dysphonia

## 2022-02-11 NOTE — HISTORY OF PRESENT ILLNESS
[de-identified] : Patient presents today with c/o hoarseness. Patient admits was referred from primary- pt does not notice any hoarseness. Patient admits in October 2021- seen in the ER due to pill stuck in her throat. Eventually pill dissolved. Patient admits waking up with throat irritation. Dry throat at times. No dysphagia with pills. No dysphagia with foods or liquids. No h/o smoking. No phonotraumatic behaviors.

## 2022-09-16 ENCOUNTER — NON-APPOINTMENT (OUTPATIENT)
Age: 27
End: 2022-09-16

## 2022-10-31 ENCOUNTER — APPOINTMENT (OUTPATIENT)
Dept: CARDIOLOGY | Facility: CLINIC | Age: 27
End: 2022-10-31

## 2022-10-31 VITALS
WEIGHT: 124 LBS | HEART RATE: 86 BPM | SYSTOLIC BLOOD PRESSURE: 98 MMHG | HEIGHT: 63 IN | DIASTOLIC BLOOD PRESSURE: 64 MMHG | OXYGEN SATURATION: 99 % | BODY MASS INDEX: 21.97 KG/M2 | RESPIRATION RATE: 16 BRPM

## 2022-10-31 VITALS — HEART RATE: 95 BPM

## 2022-10-31 DIAGNOSIS — F41.9 ANXIETY DISORDER, UNSPECIFIED: ICD-10-CM

## 2022-10-31 PROCEDURE — 93000 ELECTROCARDIOGRAM COMPLETE: CPT

## 2022-10-31 PROCEDURE — 99204 OFFICE O/P NEW MOD 45 MIN: CPT | Mod: 25

## 2022-10-31 RX ORDER — ELECTROLYTES/DEXTROSE
SOLUTION, ORAL ORAL
Refills: 0 | Status: DISCONTINUED | COMMUNITY
End: 2022-10-31

## 2022-10-31 NOTE — HISTORY OF PRESENT ILLNESS
[FreeTextEntry1] : ARACELI BEJARANO is a 26 year year old woman with no significant past medical history who presents to establish care. \par \par Araceli has had palpitations for the past couple of months. 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 She has not been sleeping well. She has been drinking a lot of coffee and not staying hydrated. She feels the palpitations most at night when she is trying to go to sleep. She has chest pain and shortness of breath associated with the palpitations. 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 She felt very hot and had floaters in front of her eyes. She was told she passed out for about ten seconds. She had a prodrome of palpitations. She did not have any seizure like activity and no loss of bowel or bladder continence. When she felt better, she went downstairs and again felt like she was about to faint. Her systolic blood pressure at this time was in the 70s mmHg. She was given IVF and felt a little better. Before these syncopal episodes, the last one was a year ago. No leg swelling, orthopnea and PND.\par

## 2022-10-31 NOTE — ASSESSMENT
[FreeTextEntry1] : 26 year old woman with syncope and palpitations. \par \par 1. Syncope: sounds like vasovagal syncope from hypovolemia. Patient is not keeping well hydrated and her BP was in the systolic 70s. However, she does have a prodrome of palpitations. \par - Discussed the importance of adequate hydration\par - Will check an echocardiogram to rule out structural abnormalities\par \par 2. Palpitations: daily symptoms. \par - Check a 14 day MCOT to rule out arrhythmias\par \par 3. Anxiety: Encouraged her to ask her PCP to put her back on Lexapro, which has helped her in the past. QTc is normal. \par \par RTC PRN.

## 2022-10-31 NOTE — REVIEW OF SYSTEMS
[SOB] : shortness of breath [Chest Discomfort] : chest discomfort [Palpitations] : palpitations [Syncope] : syncope [Heartburn] : heartburn [Dizziness] : dizziness [Anxiety] : anxiety [Under Stress] : under stress [Negative] : Heme/Lymph

## 2022-11-03 ENCOUNTER — APPOINTMENT (OUTPATIENT)
Dept: CARDIOLOGY | Facility: CLINIC | Age: 27
End: 2022-11-03

## 2022-11-03 PROCEDURE — 93306 TTE W/DOPPLER COMPLETE: CPT

## 2022-11-11 ENCOUNTER — APPOINTMENT (OUTPATIENT)
Dept: OBGYN | Facility: CLINIC | Age: 27
End: 2022-11-11

## 2022-11-11 VITALS — BODY MASS INDEX: 21.44 KG/M2 | WEIGHT: 121 LBS | TEMPERATURE: 98.2 F | HEIGHT: 63 IN

## 2022-11-11 DIAGNOSIS — Z01.419 ENCOUNTER FOR GYNECOLOGICAL EXAMINATION (GENERAL) (ROUTINE) W/OUT ABNORMAL FINDINGS: ICD-10-CM

## 2022-11-11 PROCEDURE — 99395 PREV VISIT EST AGE 18-39: CPT

## 2022-11-11 NOTE — DISCUSSION/SUMMARY
[FreeTextEntry1] : 25 yo G0, for annual exam, doing well.\par \par - f/up pap\par - discussed multiple forms of birth control. Patient desires to use condoms for now, will call office if she changes her mind\par - recommend NSAIDS 2 days prior and first 3 days of menses for painful cramping\par - declined STI screening\par - will ask mother about Gardasil vaccine\par - return to office 1yr annual exam or PRN

## 2022-11-11 NOTE — HISTORY OF PRESENT ILLNESS
Internal Medicine [FreeTextEntry1] : 27 yo G0 w/ LMP 10/14/22 here for annual exam. No complaints. She was previously taking OCPs for dysmenorrhea but stopped >1yr ago. Reports since then menses have been regular and less painful, no heavy bleeding. She is sexually active with boyfriend, uses condoms. \par \par Last pap 2020 NILM\par unsure gardasil

## 2022-11-22 LAB — CYTOLOGY CVX/VAG DOC THIN PREP: NORMAL

## 2022-11-24 ENCOUNTER — APPOINTMENT (OUTPATIENT)
Dept: CARDIOLOGY | Facility: CLINIC | Age: 27
End: 2022-11-24

## 2022-11-24 PROCEDURE — 93228 REMOTE 30 DAY ECG REV/REPORT: CPT

## 2023-12-21 ENCOUNTER — APPOINTMENT (OUTPATIENT)
Dept: CARDIOLOGY | Facility: CLINIC | Age: 28
End: 2023-12-21

## 2024-02-28 ENCOUNTER — APPOINTMENT (OUTPATIENT)
Dept: OBGYN | Facility: CLINIC | Age: 29
End: 2024-02-28

## 2024-03-04 ENCOUNTER — APPOINTMENT (OUTPATIENT)
Dept: OBGYN | Facility: CLINIC | Age: 29
End: 2024-03-04
Payer: MEDICAID

## 2024-03-04 VITALS — WEIGHT: 127 LBS | HEIGHT: 63 IN | BODY MASS INDEX: 22.5 KG/M2

## 2024-03-04 VITALS — DIASTOLIC BLOOD PRESSURE: 64 MMHG | SYSTOLIC BLOOD PRESSURE: 108 MMHG

## 2024-03-04 DIAGNOSIS — Z87.898 PERSONAL HISTORY OF OTHER SPECIFIED CONDITIONS: ICD-10-CM

## 2024-03-04 DIAGNOSIS — Z00.00 ENCOUNTER FOR GENERAL ADULT MEDICAL EXAMINATION W/OUT ABNORMAL FINDINGS: ICD-10-CM

## 2024-03-04 DIAGNOSIS — Z30.41 ENCOUNTER FOR SURVEILLANCE OF CONTRACEPTIVE PILLS: ICD-10-CM

## 2024-03-04 DIAGNOSIS — Z30.09 ENCOUNTER FOR OTHER GENERAL COUNSELING AND ADVICE ON CONTRACEPTION: ICD-10-CM

## 2024-03-04 DIAGNOSIS — R49.0 DYSPHONIA: ICD-10-CM

## 2024-03-04 DIAGNOSIS — R10.2 PELVIC AND PERINEAL PAIN: ICD-10-CM

## 2024-03-04 LAB
BILIRUBIN URINE: NORMAL
BLOOD URINE: NORMAL
GLUCOSE QUALITATIVE U: NORMAL
KETONES URINE: NORMAL
LEUKOCYTE ESTERASE URINE: NORMAL
NITRITE URINE: NORMAL
PROTEIN URINE: NORMAL
UROBILINOGEN URINE: 0.2

## 2024-03-04 PROCEDURE — 99213 OFFICE O/P EST LOW 20 MIN: CPT

## 2024-03-04 PROCEDURE — 76830 TRANSVAGINAL US NON-OB: CPT

## 2024-03-04 NOTE — HISTORY OF PRESENT ILLNESS
[FreeTextEntry1] : 29 yo G0 LMP 2/13/24 presents for pelvic pain for 2 weeks, intermittent, cramping or sharp, mild intensity. Not present today. She saw her GI who did not find any cause for the pain.  She would like to complete her pap smear as she missed her annual appointment last year.  OB: Nulligrav[employer] GYN: Denies h/o fibroids, cysts, abnormal pap smears or STIs. Regular, monthly menses She is not sexually active. She was on OCPs in the past but prefers not to use contraception. PMH: Denies PSH: Denies Meds: none  .

## 2024-03-04 NOTE — DISCUSSION/SUMMARY
[FreeTextEntry1] : 29 yo G0 LMP 2/13/24 with resolved pelvic pain, likely mittelschmerz - Discussed normal exam and ultrasound, possibly ovulation pain as timing fits and it is now resolved. - Pap smear collected - Discussed contraception, patient declined - Return annually for annual exams

## 2024-03-04 NOTE — PHYSICAL EXAM
[Appropriately responsive] : appropriately responsive [Alert] : alert [No Acute Distress] : no acute distress [Soft] : soft [Non-tender] : non-tender [Non-distended] : non-distended [No Lesions] : no lesions [Oriented x3] : oriented x3 [No Mass] : no mass [Labia Majora] : normal [Labia Minora] : normal [No Bleeding] : There was no active vaginal bleeding [Tenderness] : nontender [Normal] : normal [Enlarged ___ wks] : not enlarged [Anteversion] : anteverted [Mass ___ cm] : no uterine mass was palpated [Uterine Adnexae] : normal

## 2024-03-08 LAB — CYTOLOGY CVX/VAG DOC THIN PREP: NORMAL

## 2024-12-01 NOTE — PATIENT PROFILE ADULT - DO YOU LACK THE NECESSARY SUPPORT TO HELP YOU COPE WITH LIFE CHALLENGES?
Please return to the emergency department for any new or concerning symptoms including but not limited to fever, worsening abdominal pain, vomiting, inability to eat or drink, or any other concerning symptoms.  Please take your home medications as prescribed.  Please follow-up with your PCP to discuss your emergency department visit.  If you do not have a PCP, information for one has been provided. Thank you.    
no

## 2025-04-19 ENCOUNTER — TRANSCRIPTION ENCOUNTER (OUTPATIENT)
Age: 30
End: 2025-04-19

## 2025-04-19 ENCOUNTER — OUTPATIENT (OUTPATIENT)
Dept: OUTPATIENT SERVICES | Facility: HOSPITAL | Age: 30
LOS: 1 days | End: 2025-04-19
Payer: COMMERCIAL

## 2025-04-19 ENCOUNTER — NON-APPOINTMENT (OUTPATIENT)
Age: 30
End: 2025-04-19

## 2025-04-19 DIAGNOSIS — M79.643 PAIN IN UNSPECIFIED HAND: ICD-10-CM

## 2025-04-19 PROCEDURE — 73130 X-RAY EXAM OF HAND: CPT | Mod: 26,LT

## 2025-04-19 PROCEDURE — 73130 X-RAY EXAM OF HAND: CPT | Mod: LT

## 2025-04-20 DIAGNOSIS — M79.643 PAIN IN UNSPECIFIED HAND: ICD-10-CM

## 2025-05-29 ENCOUNTER — APPOINTMENT (OUTPATIENT)
Dept: NEUROLOGY | Facility: CLINIC | Age: 30
End: 2025-05-29

## 2025-07-31 ENCOUNTER — APPOINTMENT (OUTPATIENT)
Dept: OBGYN | Facility: CLINIC | Age: 30
End: 2025-07-31